# Patient Record
Sex: MALE | Race: BLACK OR AFRICAN AMERICAN | NOT HISPANIC OR LATINO | URBAN - METROPOLITAN AREA
[De-identification: names, ages, dates, MRNs, and addresses within clinical notes are randomized per-mention and may not be internally consistent; named-entity substitution may affect disease eponyms.]

---

## 2019-06-26 ENCOUNTER — EMERGENCY (EMERGENCY)
Facility: HOSPITAL | Age: 29
LOS: 0 days | Discharge: HOME | End: 2019-06-26
Attending: EMERGENCY MEDICINE | Admitting: EMERGENCY MEDICINE
Payer: COMMERCIAL

## 2019-06-26 VITALS
SYSTOLIC BLOOD PRESSURE: 129 MMHG | DIASTOLIC BLOOD PRESSURE: 83 MMHG | OXYGEN SATURATION: 100 % | TEMPERATURE: 98 F | RESPIRATION RATE: 18 BRPM | HEIGHT: 72 IN | WEIGHT: 190.04 LBS | HEART RATE: 73 BPM
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
## 2019-06-26 DIAGNOSIS — S61.218A LACERATION WITHOUT FOREIGN BODY OF OTHER FINGER WITHOUT DAMAGE TO NAIL, INITIAL ENCOUNTER: ICD-10-CM
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
## 2019-06-26 PROCEDURE — 99053 MED SERV 10PM-8AM 24 HR FAC: CPT

## 2019-06-26 PROCEDURE — 99283 EMERGENCY DEPT VISIT LOW MDM: CPT | Mod: 25

## 2019-06-26 NOTE — ED ADULT NURSE NOTE - NSIMPLEMENTINTERV_GEN_ALL_ED
Implemented All Universal Safety Interventions:  [place] to call system. Call bell, personal items and telephone within reach. Instruct patient to call for assistance. Room bathroom lighting operational. Non-slip footwear when patient is off stretcher. Physically safe environment: no spills, clutter or unnecessary equipment. Stretcher in lowest position, wheels locked, appropriate side rails in place.

## 2019-06-26 NOTE — ED PROVIDER NOTE - SKIN, MLM
Skin normal color for race, warm, dry and intact. No evidence of rash. tip of right thumb with avulsion injury

## 2019-06-26 NOTE — ED PROVIDER NOTE - CARE PLAN
Principal Discharge DX:	Laceration of right thumb without foreign body, nail damage status unspecified, initial encounter

## 2019-06-26 NOTE — ED ADULT NURSE NOTE - OBJECTIVE STATEMENT
Pt comes in s/p cut on finger using slicer in kitchen. Pts right thumb with bleeding to the distal portion. Wrapped in guaze.

## 2019-06-26 NOTE — ED PROVIDER NOTE - CLINICAL SUMMARY MEDICAL DECISION MAKING FREE TEXT BOX
pt with avulsion injury to tip of right thumb, gel foam and dsd apply tetanus prophylaxis given pt with avulsion injury to tip of right thumb, gel foam and dsd apply

## 2020-04-26 ENCOUNTER — MESSAGE (OUTPATIENT)
Age: 30
End: 2020-04-26

## 2020-08-29 NOTE — ED ADULT NURSE NOTE - CHIEF COMPLAINT QUOTE
cut tip of right thumb on slicer while at work Patient/Caregiver provided printed discharge information.

## 2020-11-12 ENCOUNTER — EMERGENCY (EMERGENCY)
Facility: HOSPITAL | Age: 30
LOS: 0 days | Discharge: HOME | End: 2020-11-12
Payer: COMMERCIAL

## 2020-11-12 VITALS
RESPIRATION RATE: 18 BRPM | HEIGHT: 72 IN | DIASTOLIC BLOOD PRESSURE: 51 MMHG | SYSTOLIC BLOOD PRESSURE: 115 MMHG | HEART RATE: 78 BPM | OXYGEN SATURATION: 100 % | TEMPERATURE: 98 F

## 2020-11-12 DIAGNOSIS — Z20.828 CONTACT WITH AND (SUSPECTED) EXPOSURE TO OTHER VIRAL COMMUNICABLE DISEASES: ICD-10-CM

## 2020-11-12 PROBLEM — Z78.9 OTHER SPECIFIED HEALTH STATUS: Chronic | Status: ACTIVE | Noted: 2019-06-26

## 2020-11-12 LAB — SARS-COV-2 RNA SPEC QL NAA+PROBE: SIGNIFICANT CHANGE UP

## 2020-11-12 PROCEDURE — 99283 EMERGENCY DEPT VISIT LOW MDM: CPT

## 2020-11-12 PROCEDURE — U0003: CPT

## 2020-11-12 NOTE — ED STATDOCS - OBJECTIVE STATEMENT
Patient presents with no symptoms at this time. No recent exposure to COVID-19. Pt requires testing for employment.  Patient here for testing.

## 2020-11-12 NOTE — ED ADULT TRIAGE NOTE - CAS ELECT INFOMATION PROVIDED
DISCHARGE INSTRUCTIONS REVIEWED WITH PATIENT VERBALLY, PT VERBALIZED UNDERSTANDING OF DISCHARGE INSTRUCTIONS. PAPER COPY OF DISCHARGE INSTRUCTIONS GIVEN TO PATIENT WITH SELF ANEESH AND COVID 19 INFORMATION./DC instructions

## 2020-11-12 NOTE — ED ADULT TRIAGE NOTE - CHIEF COMPLAINT QUOTE
Pt requesting COVID test. Patient evaluated, treated, and discharged by PA. Refer to PA note for assessment.  Discharge instructions given verbally and understood by patient. Self-quarantine and COVID-19 information provided to patient.

## 2020-11-12 NOTE — ED STATDOCS - PATIENT PORTAL LINK FT
You can access the FollowMyHealth Patient Portal offered by [facility] by registering at the following website: [email or web address]. By joining [email or web address]’s FollowMyHealth portal, you will also be able to view your health information using other applications (apps) compatible with our system.

## 2021-03-04 ENCOUNTER — OUTPATIENT (OUTPATIENT)
Dept: OUTPATIENT SERVICES | Facility: HOSPITAL | Age: 31
LOS: 1 days | End: 2021-03-04
Payer: COMMERCIAL

## 2021-03-04 DIAGNOSIS — Z20.828 CONTACT WITH AND (SUSPECTED) EXPOSURE TO OTHER VIRAL COMMUNICABLE DISEASES: ICD-10-CM

## 2021-03-04 LAB — SARS-COV-2 RNA SPEC QL NAA+PROBE: SIGNIFICANT CHANGE UP

## 2021-03-04 PROCEDURE — U0005: CPT

## 2021-03-04 PROCEDURE — U0003: CPT

## 2021-03-04 PROCEDURE — C9803: CPT

## 2021-03-05 DIAGNOSIS — Z20.828 CONTACT WITH AND (SUSPECTED) EXPOSURE TO OTHER VIRAL COMMUNICABLE DISEASES: ICD-10-CM

## 2021-04-13 ENCOUNTER — TRANSCRIPTION ENCOUNTER (OUTPATIENT)
Age: 31
End: 2021-04-13

## 2021-04-30 ENCOUNTER — TRANSCRIPTION ENCOUNTER (OUTPATIENT)
Age: 31
End: 2021-04-30

## 2022-01-20 NOTE — ED ADULT NURSE NOTE - LOCATION
Contacted Dr. Schrader [phone] office and no answer. Contacted patient's pharmacy [facility]  [phone] and obtained medication list. Unclear if patient was taking all the medications as patient's uncle only knew of a few medications     Home Medications:  amlodipine-olmesartan 10 mg-40 mg oral tablet: 1 tab(s) orally once a day (20 Jan 2022 17:48)  ascorbic acid 500 mg oral tablet: 1 tab(s) orally 2 times a day (20 Jan 2022 18:05)  Aspir 81 oral delayed release tablet: 1 tab(s) orally once a day (18 Jan 2022 12:34)  Bystolic 10 mg oral tablet: 1 tab(s) orally once a day (20 Jan 2022 18:05)  CeleBREX 200 mg oral capsule: 1 cap(s) orally 2 times a day (20 Jan 2022 18:05)  doxazosin 2 mg oral tablet: 0.5 tab(s) orally once a day (20 Jan 2022 18:05)  folic acid 1 mg oral tablet: 1 tab(s) orally once a day (20 Jan 2022 18:05)  Janumet 50 mg-1000 mg oral tablet: 1 tab(s) orally 2 times a day (20 Jan 2022 18:05)  Linzess 145 mcg oral capsule: 1 cap(s) orally once a day (20 Jan 2022 18:05)  modafinil 200 mg oral tablet: 1 tab(s) orally once a day (in the morning) (20 Jan 2022 18:05)  olopatadine 0.1% ophthalmic solution: 1 drop(s) to each affected eye 2 times a day (20 Jan 2022 18:05)  omeprazole-sodium bicarbonate 20 mg-1100 mg oral capsule: 1 cap(s) orally once a day (20 Jan 2022 18:05)  Plavix 75 mg oral tablet: 1 tab(s) orally once a day - unclear if taking (20 Jan 2022 18:05)  rivastigmine 4.6 mg/24 hr transdermal film, extended release: 1 patch transdermal once a day (20 Jan 2022 18:05)  SUMAtriptan 100 mg oral tablet: 1 tab(s) orally 2 times a day, As Needed (20 Jan 2022 18:05)  Trulicity Pen 1.5 mg/0.5 mL subcutaneous solution: 1.5 milligram(s) subcutaneous every 7 days (20 Jan 2022 18:05)  Uloric 40 mg oral tablet: 1 tab(s) orally once a day, As Needed (18 Jan 2022 12:34)  Vitamin D2 1.25 mg (50,000 intl units) oral capsule: 1 cap(s) orally once a week (20 Jan 2022 18:05)  Zetia 10 mg oral tablet: 1 tab(s) orally once a day (18 Jan 2022 12:34)
Spoke with patient's daughter Maria C and Austin (patient's brother) about patient's home medication. Patient managed his own medication and consulted patient's brother on some medication but they are unclear if he was taking any other medication. Contacted patient's PCP Raciel Damian [phone] but he is not in the office currently and to call back tomorrow. Per Dr. Racquel Rao [phone] and Dr. Schrader patient visit many different physicians and they do not know if there are other medications patient was prescribed.
right thumb

## 2022-05-24 NOTE — ED ADULT TRIAGE NOTE - HEIGHT IN FEET
Scheduled procedure with Patient at      Telephone Information:   Mobile [phone]      Scheduled Via: Case Request Order for  Jaycob Mariel Maza  Procedure date: 06/21/2022  Procedure time: arrival at 10:00am / begin at 11:15am  Rep Contacted?: n/a  Entered into MD's [place]? n/a  Insurance confirmed as Medicare A&B / [address] [place] [address] / [facility], will be the same at time of procedure?: Yes  Letter sent via [facility] Yes  Is this a STAAR PT? No    The following have been confirmed:  Latex Allergy No  Diabetic Yes  Sleep Apnea No  Diuretic/Water pill No  Defibrillator/Pacemaker No  MRSA hx No  Blood thinners: Coumadin (Warfarin) or Plavix Yes      Aspirin Yes      Phentermine (diet pill) No  Pre-Op testing required Yes, Patient informed Yes  Prep required? yes; Briefly reviewed? Yes; Prep cost range discussed? n/a  If procedure is scheduled 7 days or less, patient was told to  prep letter?: n/a    Lisa Kunz, the Nurse Practitioner who works with Dr. Kenia Avilez, to do H&P. Patient is aware of the precautions to take after having the covid testing completed. 6

## 2023-02-12 ENCOUNTER — NON-APPOINTMENT (OUTPATIENT)
Age: 33
End: 2023-02-12

## 2023-07-27 ENCOUNTER — APPOINTMENT (OUTPATIENT)
Dept: INTERNAL MEDICINE | Facility: CLINIC | Age: 33
End: 2023-07-27
Payer: COMMERCIAL

## 2023-07-27 VITALS
WEIGHT: 256 LBS | HEART RATE: 112 BPM | OXYGEN SATURATION: 97 % | BODY MASS INDEX: 35.84 KG/M2 | DIASTOLIC BLOOD PRESSURE: 80 MMHG | SYSTOLIC BLOOD PRESSURE: 114 MMHG | RESPIRATION RATE: 16 BRPM | TEMPERATURE: 98.4 F | HEIGHT: 71 IN

## 2023-07-27 DIAGNOSIS — Z00.00 ENCOUNTER FOR GENERAL ADULT MEDICAL EXAMINATION W/OUT ABNORMAL FINDINGS: ICD-10-CM

## 2023-07-27 DIAGNOSIS — M54.40 LUMBAGO WITH SCIATICA, UNSPECIFIED SIDE: ICD-10-CM

## 2023-07-27 DIAGNOSIS — M54.16 RADICULOPATHY, LUMBAR REGION: ICD-10-CM

## 2023-07-27 DIAGNOSIS — M54.41 LUMBAGO WITH SCIATICA, RIGHT SIDE: ICD-10-CM

## 2023-07-27 DIAGNOSIS — Z78.9 OTHER SPECIFIED HEALTH STATUS: ICD-10-CM

## 2023-07-27 PROCEDURE — 99395 PREV VISIT EST AGE 18-39: CPT

## 2023-07-27 RX ORDER — MELOXICAM 15 MG/1
15 TABLET ORAL DAILY
Qty: 14 | Refills: 0 | Status: ACTIVE | COMMUNITY
Start: 2023-07-27 | End: 1900-01-01

## 2023-07-27 RX ORDER — METHYLPREDNISOLONE 4 MG/1
4 TABLET ORAL
Qty: 1 | Refills: 0 | Status: ACTIVE | COMMUNITY
Start: 2023-07-27 | End: 1900-01-01

## 2023-07-27 RX ORDER — CYCLOBENZAPRINE HYDROCHLORIDE 5 MG/1
5 TABLET, FILM COATED ORAL EVERY 8 HOURS
Qty: 45 | Refills: 0 | Status: ACTIVE | COMMUNITY
Start: 2023-07-27 | End: 1900-01-01

## 2023-07-27 NOTE — REVIEW OF SYSTEMS
[Muscle Pain] : muscle pain [Back Pain] : back pain [Negative] : Heme/Lymph [Dysuria] : no dysuria [Hematuria] : no hematuria [Joint Pain] : no joint pain [Itching] : no itching [Skin Rash] : no skin rash [Headache] : no headache [Dizziness] : no dizziness [Fainting] : no fainting [FreeTextEntry9] : lower back, right side with radiation of pain down right leg, + paresthesias.

## 2023-07-27 NOTE — PHYSICAL EXAM
[No Acute Distress] : no acute distress [Normal Sclera/Conjunctiva] : normal sclera/conjunctiva [EOMI] : extraocular movements intact [Normal Outer Ear/Nose] : the outer ears and nose were normal in appearance [Normal Oropharynx] : the oropharynx was normal [Normal TMs] : both tympanic membranes were normal [No JVD] : no jugular venous distention [No Lymphadenopathy] : no lymphadenopathy [Supple] : supple [Thyroid Normal, No Nodules] : the thyroid was normal and there were no nodules present [No Respiratory Distress] : no respiratory distress  [No Accessory Muscle Use] : no accessory muscle use [Clear to Auscultation] : lungs were clear to auscultation bilaterally [Normal Rate] : normal rate  [Regular Rhythm] : with a regular rhythm [Normal S1, S2] : normal S1 and S2 [No Murmur] : no murmur heard [No Edema] : there was no peripheral edema [No Extremity Clubbing/Cyanosis] : no extremity clubbing/cyanosis [Soft] : abdomen soft [Non Tender] : non-tender [Non-distended] : non-distended [No Masses] : no abdominal mass palpated [No HSM] : no HSM [Normal Bowel Sounds] : normal bowel sounds [Normal Anterior Cervical Nodes] : no anterior cervical lymphadenopathy [No Joint Swelling] : no joint swelling [Grossly Normal Strength/Tone] : grossly normal strength/tone [No Rash] : no rash [No Focal Deficits] : no focal deficits [Normal Gait] : normal gait [Normal Affect] : the affect was normal [Alert and Oriented x3] : oriented to person, place, and time [Normal Insight/Judgement] : insight and judgment were intact [de-identified] : + pain on straight leg raise.

## 2023-07-27 NOTE — PLAN
[FreeTextEntry1] : 1. Complete fasting labs.\par 2. MRI lumbar spine. \par 3. Start Meloxicam 15mg daily x 14 days.\par 4. Start Medrol dose pack.\par 5. Start Flexeril 5mg q 8 hours PRN.\par 6. F/U with dermatology and opthalmology.\par 7. F/U with Ortho/spine.\par 8. Refused vaccines at this time. \par 9. Rest, advised not to play sports at this time. \par 10. Medical note given for work.

## 2023-07-27 NOTE — HEALTH RISK ASSESSMENT
[Monthly or less (1 pt)] : Monthly or less (1 point) [No] : In the past 12 months have you used drugs other than those required for medical reasons? No [1] : 1) Little interest or pleasure doing things for several days (1) [Never] : Never [Yes] : Yes [2 - 4 times a month (2 pts)] : 2-4 times a month (2 points) [1 or 2 (0 pts)] : 1 or 2 (0 points) [Never (0 pts)] : Never (0 points) [0] : 2) Feeling down, depressed, or hopeless: Not at all (0) [PHQ-2 Negative - No further assessment needed] : PHQ-2 Negative - No further assessment needed

## 2023-07-27 NOTE — PHYSICAL EXAM
[No Acute Distress] : no acute distress [Normal Sclera/Conjunctiva] : normal sclera/conjunctiva [EOMI] : extraocular movements intact [Normal Outer Ear/Nose] : the outer ears and nose were normal in appearance [Normal Oropharynx] : the oropharynx was normal [Normal TMs] : both tympanic membranes were normal [No JVD] : no jugular venous distention [No Lymphadenopathy] : no lymphadenopathy [Supple] : supple [Thyroid Normal, No Nodules] : the thyroid was normal and there were no nodules present [No Respiratory Distress] : no respiratory distress  [No Accessory Muscle Use] : no accessory muscle use [Clear to Auscultation] : lungs were clear to auscultation bilaterally [Normal Rate] : normal rate  [Regular Rhythm] : with a regular rhythm [Normal S1, S2] : normal S1 and S2 [No Murmur] : no murmur heard [No Edema] : there was no peripheral edema [No Extremity Clubbing/Cyanosis] : no extremity clubbing/cyanosis [Soft] : abdomen soft [Non Tender] : non-tender [Non-distended] : non-distended [No Masses] : no abdominal mass palpated [No HSM] : no HSM [Normal Bowel Sounds] : normal bowel sounds [Normal Anterior Cervical Nodes] : no anterior cervical lymphadenopathy [No Joint Swelling] : no joint swelling [Grossly Normal Strength/Tone] : grossly normal strength/tone [No Rash] : no rash [No Focal Deficits] : no focal deficits [Normal Gait] : normal gait [Normal Affect] : the affect was normal [Alert and Oriented x3] : oriented to person, place, and time [Normal Insight/Judgement] : insight and judgment were intact [de-identified] : + pain on straight leg raise.

## 2023-07-27 NOTE — HISTORY OF PRESENT ILLNESS
[FreeTextEntry1] : Establish care/Acute back pain [de-identified] : 33M w/ no significant PMHx presents to office to establish care, he has not seen an MD in > 10 years. He also reports right lower back pain with pain that radiates down left leg with RLE paresthesias. Seen at [facility] MD, X-rays were taken and reportedly normal. He has taken Ibuprofen with no improvement in symptoms. He reports hurting himself playing soccer. He is also drives for a living. pain is worse with sitting, better with standing, although if he stands too long he has pain. Pain 9/10. \par \par He reports no hospitalization or surgeries. Not prescribed any medications.\par Social EtOH, never smoker, no illicit drugs. \par Family Hx + for HTN and DM, Mother 59 w/ HTN. 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 Has one child. \par Plays soccer 2-3 x a week. No dietary discretion. 3-4 Caffeinated beverages daily.\par No allergies to food or medications.  \par \par Has never seen and ophthalmologist or dermatologist. Last dental exam less than a year ago. Not up to date on vaccines.

## 2023-07-27 NOTE — HISTORY OF PRESENT ILLNESS
[FreeTextEntry1] : Establish care/Acute back pain [de-identified] : 33M w/ no significant PMHx presents to office to establish care, he has not seen an MD in > 10 years. He also reports right lower back pain with pain that radiates down left leg with RLE paresthesias. Seen at [facility] MD, X-rays were taken and reportedly normal. He has taken Ibuprofen with no improvement in symptoms. He reports hurting himself playing soccer. He is also drives for a living. pain is worse with sitting, better with standing, although if he stands too long he has pain. Pain 9/10. \par \par He reports no hospitalization or surgeries. Not prescribed any medications.\par Social EtOH, never smoker, no illicit drugs. \par Family Hx + for HTN and DM, Mother 59 w/ HTN. 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 Has one child. \par Plays soccer 2-3 x a week. No dietary discretion. 3-4 Caffeinated beverages daily.\par No allergies to food or medications.  \par \par Has never seen and ophthalmologist or dermatologist. Last dental exam less than a year ago. Not up to date on vaccines.

## 2023-07-28 ENCOUNTER — NON-APPOINTMENT (OUTPATIENT)
Age: 33
End: 2023-07-28

## 2023-07-28 LAB
HAPTOGLOB SERPL-MCNC: 126 MG/DL
T3FREE SERPL-MCNC: 3.62 PG/ML
TRANSFERRIN SERPL-MCNC: 244 MG/DL
VIT B12 SERPL-MCNC: 789 PG/ML

## 2023-07-31 ENCOUNTER — NON-APPOINTMENT (OUTPATIENT)
Age: 33
End: 2023-07-31

## 2023-07-31 LAB
25(OH)D3 SERPL-MCNC: 18.2 NG/ML
ALBUMIN SERPL ELPH-MCNC: 4.5 G/DL
ALP BLD-CCNC: 48 U/L
ALT SERPL-CCNC: 27 U/L
ANION GAP SERPL CALC-SCNC: 15 MMOL/L
APPEARANCE: CLEAR
AST SERPL-CCNC: 19 U/L
BACTERIA: NEGATIVE /HPF
BILIRUB SERPL-MCNC: 0.9 MG/DL
BILIRUBIN URINE: NEGATIVE
BLOOD URINE: NEGATIVE
BUN SERPL-MCNC: 13 MG/DL
C TRACH RRNA SPEC QL NAA+PROBE: NOT DETECTED
CALCIUM SERPL-MCNC: 10 MG/DL
CAST: 0 /LPF
CHLORIDE SERPL-SCNC: 99 MMOL/L
CHOLEST SERPL-MCNC: 269 MG/DL
CO2 SERPL-SCNC: 25 MMOL/L
COLOR: YELLOW
CREAT SERPL-MCNC: 1.05 MG/DL
EGFR: 95 ML/MIN/1.73M2
EPITHELIAL CELLS: 0 /HPF
ESTIMATED AVERAGE GLUCOSE: 114 MG/DL
FERRITIN SERPL-MCNC: 180 NG/ML
FOLATE SERPL-MCNC: 13 NG/ML
GLUCOSE QUALITATIVE U: NEGATIVE MG/DL
GLUCOSE SERPL-MCNC: 95 MG/DL
HBA1C MFR BLD HPLC: 5.6 %
HBV CORE IGG+IGM SER QL: NONREACTIVE
HBV SURFACE AB SER QL: REACTIVE
HBV SURFACE AB SERPL IA-ACNC: 191.6 MIU/ML
HBV SURFACE AG SER QL: NONREACTIVE
HCV AB SER QL: NONREACTIVE
HCV S/CO RATIO: 0.08 S/CO
HDLC SERPL-MCNC: 50 MG/DL
HIV1+2 AB SPEC QL IA.RAPID: NONREACTIVE
IRON SATN MFR SERPL: 32 %
IRON SERPL-MCNC: 97 UG/DL
KETONES URINE: NEGATIVE MG/DL
LDLC SERPL CALC-MCNC: 194 MG/DL
LEUKOCYTE ESTERASE URINE: ABNORMAL
MEV IGG FLD QL IA: 69.4 AU/ML
MEV IGG+IGM SER-IMP: POSITIVE
MICROSCOPIC-UA: NORMAL
MUV AB SER-ACNC: POSITIVE
MUV IGG SER QL IA: 150 AU/ML
N GONORRHOEA RRNA SPEC QL NAA+PROBE: NOT DETECTED
NITRITE URINE: NEGATIVE
NONHDLC SERPL-MCNC: 219 MG/DL
PH URINE: 6.5
POTASSIUM SERPL-SCNC: 4.6 MMOL/L
PROT SERPL-MCNC: 7.3 G/DL
PROTEIN URINE: NEGATIVE MG/DL
RED BLOOD CELLS URINE: 0 /HPF
RUBV IGG FLD-ACNC: 8.4 INDEX
RUBV IGG SER-IMP: POSITIVE
SODIUM SERPL-SCNC: 138 MMOL/L
SOURCE AMPLIFICATION: NORMAL
SPECIFIC GRAVITY URINE: 1.02
T PALLIDUM AB SER QL IA: NEGATIVE
T4 FREE SERPL-MCNC: 1.3 NG/DL
TIBC SERPL-MCNC: 303 UG/DL
TRIGL SERPL-MCNC: 138 MG/DL
TSH SERPL-ACNC: 1.96 UIU/ML
UIBC SERPL-MCNC: 206 UG/DL
UROBILINOGEN URINE: 0.2 MG/DL
VZV AB TITR SER: POSITIVE
VZV IGG SER IF-ACNC: 1336 INDEX
WHITE BLOOD CELLS URINE: 0 /HPF

## 2023-08-02 DIAGNOSIS — E78.5 HYPERLIPIDEMIA, UNSPECIFIED: ICD-10-CM

## 2023-08-02 DIAGNOSIS — E55.9 VITAMIN D DEFICIENCY, UNSPECIFIED: ICD-10-CM

## 2023-08-02 DIAGNOSIS — D75.1 SECONDARY POLYCYTHEMIA: ICD-10-CM

## 2023-08-02 RX ORDER — ERGOCALCIFEROL 1.25 MG/1
1.25 MG CAPSULE, LIQUID FILLED ORAL
Qty: 12 | Refills: 0 | Status: ACTIVE | COMMUNITY
Start: 2023-08-02 | End: 1900-01-01

## 2023-12-31 ENCOUNTER — NON-APPOINTMENT (OUTPATIENT)
Age: 33
End: 2023-12-31

## 2024-02-04 ENCOUNTER — NON-APPOINTMENT (OUTPATIENT)
Age: 34
End: 2024-02-04

## 2024-11-18 ENCOUNTER — NON-APPOINTMENT (OUTPATIENT)
Age: 34
End: 2024-11-18